# Patient Record
Sex: MALE | Race: WHITE | Employment: UNEMPLOYED | URBAN - METROPOLITAN AREA
[De-identification: names, ages, dates, MRNs, and addresses within clinical notes are randomized per-mention and may not be internally consistent; named-entity substitution may affect disease eponyms.]

---

## 2017-10-08 ENCOUNTER — APPOINTMENT (OUTPATIENT)
Dept: GENERAL RADIOLOGY | Age: 37
End: 2017-10-08
Attending: FAMILY MEDICINE
Payer: MEDICAID

## 2017-10-08 ENCOUNTER — HOSPITAL ENCOUNTER (OUTPATIENT)
Age: 37
Discharge: HOME | End: 2017-10-08
Attending: FAMILY MEDICINE
Payer: MEDICAID

## 2017-10-08 VITALS
WEIGHT: 215 LBS | BODY MASS INDEX: 26.73 KG/M2 | TEMPERATURE: 97 F | HEIGHT: 75 IN | HEART RATE: 77 BPM | RESPIRATION RATE: 16 BRPM | OXYGEN SATURATION: 98 % | DIASTOLIC BLOOD PRESSURE: 99 MMHG | SYSTOLIC BLOOD PRESSURE: 131 MMHG

## 2017-10-08 DIAGNOSIS — S61.214A LACERATION OF RIGHT RING FINGER WITHOUT FOREIGN BODY WITHOUT DAMAGE TO NAIL, INITIAL ENCOUNTER: Primary | ICD-10-CM

## 2017-10-08 PROCEDURE — 99203 OFFICE O/P NEW LOW 30 MIN: CPT

## 2017-10-08 PROCEDURE — 12001 RPR S/N/AX/GEN/TRNK 2.5CM/<: CPT

## 2017-10-08 PROCEDURE — 73140 X-RAY EXAM OF FINGER(S): CPT | Performed by: FAMILY MEDICINE

## 2017-10-08 NOTE — ED PROVIDER NOTES
Patient Seen in: [address] [facility]    History   Patient presents with:  Laceration Abrasion (integumentary)    Stated Complaint: RIGHT RING FINGER LAC    HPI    41-year-old male presents to the clinic today with chief complaints of lacerati Physical Exam    General appearance: alert, appears stated age and cooperative  Lungs: clear to auscultation bilaterally  Heart: S1, S2 normal, no murmur, click, rub or gallop, regular rate and rhythm  Abdomen: soft, non-tender; bowel sounds normal procedure was confirmed  Wound was washed thoroughly and explored for any foreign bodies.  No foreign bodies identified  Anesthetic used: 2 % Lidocaine  Type of anesthesia: local - thecal block given   Type of suture material: 5-0 Ethilon   Suturing techniq

## 2017-10-23 ENCOUNTER — HOSPITAL ENCOUNTER (OUTPATIENT)
Age: 37
Discharge: HOME | End: 2017-10-23
Payer: MEDICAID

## 2017-10-23 VITALS
BODY MASS INDEX: 27 KG/M2 | SYSTOLIC BLOOD PRESSURE: 151 MMHG | WEIGHT: 215 LBS | OXYGEN SATURATION: 99 % | DIASTOLIC BLOOD PRESSURE: 91 MMHG | HEART RATE: 96 BPM | RESPIRATION RATE: 17 BRPM | TEMPERATURE: 99 F

## 2017-10-23 DIAGNOSIS — Z51.89 VISIT FOR WOUND CHECK: Primary | ICD-10-CM

## 2017-10-23 PROCEDURE — 99211 OFF/OP EST MAY X REQ PHY/QHP: CPT

## 2017-10-23 NOTE — ED PROVIDER NOTES
Patient Seen in: [address] [facility] [place]    History   Patient presents with: Follow - Up    Stated Complaint: removed stitches himself/pink and painful    42-year-old male presents today with history of sutures to the right ring finger.   Patient [10/23/17 1624]  BP: 151/91  Pulse: 96  Resp: 17  Temp: 98.7 °F (37.1 °C)  Temp src: Temporal  SpO2: 99 %  O2 Device: None (Room air)    Current:/91   Pulse 96   Temp 98.7 °F (37.1 °C) (Temporal)   Resp 17   Wt 97.5 kg   SpO2 99%   BMI 26.87 kg/m²

## 2017-10-23 NOTE — ED INITIAL ASSESSMENT (HPI)
Pt had sutures placed 10/8 in his right 2nd digit.   Pt states he took the sutures out at home and wants to be sure his finger is okay

## 2022-10-17 ENCOUNTER — HOSPITAL ENCOUNTER (OUTPATIENT)
Age: 42
Discharge: OTHER INSTITUTION | End: 2022-10-17
Payer: COMMERCIAL

## 2022-10-17 VITALS
HEART RATE: 84 BPM | DIASTOLIC BLOOD PRESSURE: 87 MMHG | TEMPERATURE: 98 F | SYSTOLIC BLOOD PRESSURE: 134 MMHG | OXYGEN SATURATION: 98 % | RESPIRATION RATE: 20 BRPM

## 2022-10-17 DIAGNOSIS — R10.11 ABDOMINAL PAIN, RUQ: Primary | ICD-10-CM

## 2022-10-17 DIAGNOSIS — K85.90 ACUTE PANCREATITIS, UNSPECIFIED COMPLICATION STATUS, UNSPECIFIED PANCREATITIS TYPE: ICD-10-CM

## 2022-10-17 PROCEDURE — 99204 OFFICE O/P NEW MOD 45 MIN: CPT | Performed by: NURSE PRACTITIONER

## 2023-11-10 ENCOUNTER — HOSPITAL ENCOUNTER (OUTPATIENT)
Age: 43
Discharge: HOME | End: 2023-11-10
Payer: COMMERCIAL

## 2023-11-10 ENCOUNTER — APPOINTMENT (OUTPATIENT)
Dept: GENERAL RADIOLOGY | Age: 43
End: 2023-11-10
Attending: PHYSICIAN ASSISTANT
Payer: COMMERCIAL

## 2023-11-10 VITALS
HEART RATE: 98 BPM | SYSTOLIC BLOOD PRESSURE: 140 MMHG | DIASTOLIC BLOOD PRESSURE: 92 MMHG | OXYGEN SATURATION: 97 % | TEMPERATURE: 98 F | RESPIRATION RATE: 20 BRPM

## 2023-11-10 DIAGNOSIS — M54.2 NECK PAIN: ICD-10-CM

## 2023-11-10 DIAGNOSIS — M43.6 TORTICOLLIS: Primary | ICD-10-CM

## 2023-11-10 PROCEDURE — 72050 X-RAY EXAM NECK SPINE 4/5VWS: CPT | Performed by: PHYSICIAN ASSISTANT

## 2023-11-10 PROCEDURE — 72040 X-RAY EXAM NECK SPINE 2-3 VW: CPT | Performed by: PHYSICIAN ASSISTANT

## 2023-11-10 PROCEDURE — 99203 OFFICE O/P NEW LOW 30 MIN: CPT | Performed by: PHYSICIAN ASSISTANT

## 2023-11-10 RX ORDER — NAPROXEN 500 MG/1
500 TABLET ORAL 2 TIMES DAILY PRN
Qty: 14 TABLET | Refills: 0 | Status: SHIPPED | OUTPATIENT
Start: 2023-11-10 | End: 2023-11-17

## 2023-11-10 RX ORDER — CYCLOBENZAPRINE HCL 10 MG
10 TABLET ORAL 3 TIMES DAILY PRN
Qty: 20 TABLET | Refills: 0 | Status: SHIPPED | OUTPATIENT
Start: 2023-11-10 | End: 2023-11-17

## 2023-11-11 NOTE — DISCHARGE INSTRUCTIONS
Moist warm compresses   Take naproxen twice a day - take with food, can use tylenol as needed for pain  May use muscle relaxant as needed- will make you drowsy do not drive on medication  Follow up with workman's comp   Return to the ER if symptoms worsen
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
## 2023-11-17 ENCOUNTER — OFFICE VISIT (OUTPATIENT)
Dept: OCCUPATIONAL MEDICINE | Age: 43
End: 2023-11-17
Attending: PHYSICIAN ASSISTANT

## 2023-11-17 DIAGNOSIS — S16.1XXA CERVICAL STRAIN, ACUTE, INITIAL ENCOUNTER: Primary | ICD-10-CM

## 2023-11-28 ENCOUNTER — OFFICE VISIT (OUTPATIENT)
Dept: OCCUPATIONAL MEDICINE | Age: 43
End: 2023-11-28
Attending: FAMILY MEDICINE

## 2023-12-05 ENCOUNTER — OFFICE VISIT (OUTPATIENT)
Dept: OCCUPATIONAL MEDICINE | Age: 43
End: 2023-12-05
Attending: PHYSICIAN ASSISTANT

## 2023-12-05 DIAGNOSIS — S16.1XXD CERVICAL STRAIN, SUBSEQUENT ENCOUNTER: Primary | ICD-10-CM

## 2024-10-07 ENCOUNTER — OFFICE VISIT (OUTPATIENT)
Dept: FAMILY MEDICINE CLINIC | Facility: CLINIC | Age: 44
End: 2024-10-07
Payer: COMMERCIAL

## 2024-10-07 VITALS
BODY MASS INDEX: 27 KG/M2 | TEMPERATURE: 97 F | SYSTOLIC BLOOD PRESSURE: 126 MMHG | WEIGHT: 215 LBS | DIASTOLIC BLOOD PRESSURE: 89 MMHG | RESPIRATION RATE: 20 BRPM | OXYGEN SATURATION: 96 % | HEART RATE: 108 BPM

## 2024-10-07 DIAGNOSIS — T14.8XXA ABRASION: Primary | ICD-10-CM

## 2024-10-07 PROCEDURE — 3074F SYST BP LT 130 MM HG: CPT | Performed by: NURSE PRACTITIONER

## 2024-10-07 PROCEDURE — 99213 OFFICE O/P EST LOW 20 MIN: CPT | Performed by: NURSE PRACTITIONER

## 2024-10-07 PROCEDURE — 3079F DIAST BP 80-89 MM HG: CPT | Performed by: NURSE PRACTITIONER
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
## (undated) NOTE — LETTER
[facility] [place]  [id number]  Dept: [phone]  Dept Fax: [phone]         October 8, 2017    Patient: Lina Hernadez   YOB: 1980   Date of Visit: 10/8/2017       To Whom It May Concern:     Shaq Villegas

## (undated) NOTE — LETTER
Date & Time: 11/10/2023, 7:05 PM  Patient: Raissa Pascal  Encounter Provider(s):    JACE Leiva       To Whom It May Concern:    Em Lopes was seen and treated in our department on 11/10/2023. He should not return to work until cleared by [employer] .     If you have any questions or concerns, please do not hesitate to call.        _____________________________  Physician/APC Signature